# Patient Record
Sex: MALE | Race: WHITE
[De-identification: names, ages, dates, MRNs, and addresses within clinical notes are randomized per-mention and may not be internally consistent; named-entity substitution may affect disease eponyms.]

---

## 2019-11-03 ENCOUNTER — HOSPITAL ENCOUNTER (EMERGENCY)
Dept: HOSPITAL 62 - ER | Age: 39
Discharge: OTHER INSTITUTION | End: 2019-11-03
Payer: SELF-PAY

## 2019-11-03 VITALS — SYSTOLIC BLOOD PRESSURE: 115 MMHG | DIASTOLIC BLOOD PRESSURE: 79 MMHG

## 2019-11-03 DIAGNOSIS — R41.82: Primary | ICD-10-CM

## 2019-11-03 DIAGNOSIS — F17.200: ICD-10-CM

## 2019-11-03 LAB
ADD MANUAL DIFF: NO
ADD MANUAL MICROSCOPIC: YES
ALBUMIN SERPL-MCNC: 4.2 G/DL (ref 3.5–5)
ALP SERPL-CCNC: 92 U/L (ref 38–126)
ANION GAP SERPL CALC-SCNC: 7 MMOL/L (ref 5–19)
APAP SERPL-MCNC: < 10 UG/ML (ref 10–30)
APPEARANCE UR: CLEAR
APTT PPP: YELLOW S
AST SERPL-CCNC: 30 U/L (ref 17–59)
BACTERIA #/AREA URNS HPF: (no result) /HPF
BARBITURATES UR QL SCN: NEGATIVE
BASOPHILS # BLD AUTO: 0 10^3/UL (ref 0–0.2)
BASOPHILS NFR BLD AUTO: 0.4 % (ref 0–2)
BILIRUB DIRECT SERPL-MCNC: 0 MG/DL (ref 0–0.4)
BILIRUB SERPL-MCNC: 1.1 MG/DL (ref 0.2–1.3)
BILIRUB UR QL STRIP: NEGATIVE
BUN SERPL-MCNC: 15 MG/DL (ref 7–20)
CALCIUM: 9.5 MG/DL (ref 8.4–10.2)
CHLORIDE SERPL-SCNC: 101 MMOL/L (ref 98–107)
CO2 SERPL-SCNC: 33 MMOL/L (ref 22–30)
EOSINOPHIL # BLD AUTO: 0.2 10^3/UL (ref 0–0.6)
EOSINOPHIL NFR BLD AUTO: 3.6 % (ref 0–6)
ERYTHROCYTE [DISTWIDTH] IN BLOOD BY AUTOMATED COUNT: 12.8 % (ref 11.5–14)
ETHANOL SERPL-MCNC: < 10 MG/DL
GLUCOSE SERPL-MCNC: 89 MG/DL (ref 75–110)
GLUCOSE UR STRIP-MCNC: NEGATIVE MG/DL
HCT VFR BLD CALC: 41.2 % (ref 37.9–51)
HGB BLD-MCNC: 14.2 G/DL (ref 13.5–17)
HYALINE CASTS #/AREA URNS LPF: (no result) /LPF
KETONES UR STRIP-MCNC: NEGATIVE MG/DL
LYMPHOCYTES # BLD AUTO: 2.2 10^3/UL (ref 0.5–4.7)
LYMPHOCYTES NFR BLD AUTO: 39.2 % (ref 13–45)
MCH RBC QN AUTO: 30.3 PG (ref 27–33.4)
MCHC RBC AUTO-ENTMCNC: 34.4 G/DL (ref 32–36)
MCV RBC AUTO: 88 FL (ref 80–97)
METHADONE UR QL SCN: NEGATIVE
MONOCYTES # BLD AUTO: 0.4 10^3/UL (ref 0.1–1.4)
MONOCYTES NFR BLD AUTO: 8 % (ref 3–13)
NEUTROPHILS # BLD AUTO: 2.7 10^3/UL (ref 1.7–8.2)
NEUTS SEG NFR BLD AUTO: 48.8 % (ref 42–78)
NITRITE UR QL STRIP: NEGATIVE
PCP UR QL SCN: NEGATIVE
PH UR STRIP: 5 [PH] (ref 5–9)
PLATELET # BLD: 208 10^3/UL (ref 150–450)
POTASSIUM SERPL-SCNC: 4.1 MMOL/L (ref 3.6–5)
PROT SERPL-MCNC: 7.5 G/DL (ref 6.3–8.2)
PROT UR STRIP-MCNC: NEGATIVE MG/DL
RBC # BLD AUTO: 4.69 10^6/UL (ref 4.35–5.55)
RBC #/AREA URNS HPF: (no result) /HPF
SALICYLATES SERPL-MCNC: < 1 MG/DL (ref 2–20)
SP GR UR STRIP: 1.03
TOTAL CELLS COUNTED % (AUTO): 100 %
URINE BENZODIAZEPINES SCREEN: NEGATIVE
URINE COCAINE SCREEN: NEGATIVE
URINE MARIJUANA (THC) SCREEN: NEGATIVE
UROBILINOGEN UR-MCNC: 2 MG/DL (ref ?–2)
WBC # BLD AUTO: 5.5 10^3/UL (ref 4–10.5)
WBC #/AREA URNS HPF: (no result) /HPF

## 2019-11-03 PROCEDURE — 80053 COMPREHEN METABOLIC PANEL: CPT

## 2019-11-03 PROCEDURE — 93010 ELECTROCARDIOGRAM REPORT: CPT

## 2019-11-03 PROCEDURE — 80307 DRUG TEST PRSMV CHEM ANLYZR: CPT

## 2019-11-03 PROCEDURE — 85025 COMPLETE CBC W/AUTO DIFF WBC: CPT

## 2019-11-03 PROCEDURE — 81001 URINALYSIS AUTO W/SCOPE: CPT

## 2019-11-03 PROCEDURE — 36415 COLL VENOUS BLD VENIPUNCTURE: CPT

## 2019-11-03 PROCEDURE — 99284 EMERGENCY DEPT VISIT MOD MDM: CPT

## 2019-11-03 PROCEDURE — 93005 ELECTROCARDIOGRAM TRACING: CPT

## 2019-11-03 NOTE — ER DOCUMENT REPORT
ED Substance Abuse / Acc. OD





<GM BARBOZA - Last Filed: 11/03/19 09:54>





- General


Mode of Arrival: Ambulatory


Information source: Patient


Cannot obtain history due to: Other - Patient drowsy, patient states he has 

difficulty concentrating on the answers to questions





- HPI


Patient complains to provider of: Substance abuse


Onset: This afternoon


Quality of pain: No pain


Associated Symptoms: None





<JAZMIN CHAIREZ - Last Filed: 11/04/19 01:15>





- General


Chief Complaint: Medical Clearance


Stated Complaint: DRUG WITHDRAWAL


Time Seen by Provider: 11/03/19 01:32 EST


Notes: 





Patient was sent here from the detox facility due to him falling asleep in a 

quiet room.  Patient states that he was seeking treatment for substance abuse 

involving snorting heroin and methamphetamine.  Patient denies any IV drug use. 

Patient denies any suicidal homicidal ideation.  Patient reports last using 

heroin and meth earlier this afternoon. (JAZMIN CHAIREZ)





- Related Data


Allergies/Adverse Reactions: 


                                        





No Known Allergies Allergy (Unverified 11/03/19 01:25 EST)


   











Past Medical History





- General


Information source: Patient


Cannot obtain history due to: Intoxicated





- Social History


Smoking Status: Current Every Day Smoker


Drug Abuse: Heroin, Methamphetamine


Family History: Reviewed & Not Pertinent


Patient has suicidal ideation: No


Patient has homicidal ideation: No


Psychiatric Medical History: Reports: Other - Unspecified mental illness





<JAZMIN CHAIREZ - Last Filed: 11/04/19 01:15>





Review of Systems





- Review of Systems


-: Yes ROS unobtainable due to patient's medical condition





<JAZMIN CHAIREZ - Last Filed: 11/04/19 01:15>





Physical Exam





- General


General appearance: Appears well, Other - drowsy, arouses easily to voice


In distress: None





- HEENT


Head: Normocephalic, Atraumatic


Eyes: Normal


Nasal: Normal


Neck: Normal, Supple





- Respiratory


Respiratory status: No respiratory distress


Chest status: Nontender


Breath sounds: Normal.  No: Rales, Rhonchi, Stridor, Wheezing


Chest palpation: Normal





- Cardiovascular


Rhythm: Regular


Heart sounds: S1 appreciated, S2 appreciated





- Back


Back: Normal





- Extremities


General upper extremity: Normal inspection, Normal strength


General lower extremity: Normal inspection, Normal strength





- Neurological


Boston Coma Scale Eye Opening: Spontaneous


Tadeo Coma Scale Verbal: Oriented


Tadeo Coma Scale Motor: Obeys Commands


Boston Coma Scale Total: 15





- Skin


Skin Temperature: Warm


Skin Moisture: Dry


Skin Color: Normal





<JAZMIN CHAIREZ - Last Filed: 11/04/19 01:15>





- Vital signs


Vitals: 


                                        











Temp Pulse Resp BP Pulse Ox


 


 98.0 F   77   18   122/95 H  98 


 


 11/03/19 01:23 EST  11/03/19 01:23 EST  11/03/19 01:23 EST  11/03/19 01:23 EST 

11/03/19 01:23 EST














Course





- Laboratory


Result Diagrams: 


                                 11/03/19 02:15





                                 11/03/19 02:15





<GM BARBOZA - Last Filed: 11/03/19 09:54>





- Laboratory


Result Diagrams: 


                                 11/03/19 02:15





                                 11/03/19 02:15





<JAZMIN CHAIREZ - Last Filed: 11/04/19 01:15>





- Re-evaluation


Re-evalutation: 





11/03/19 09:54





Laboratory











  11/03/19 11/03/19 11/03/19





  02:15 02:15 06:50


 


WBC  5.5  


 


RBC  4.69  


 


Hgb  14.2  


 


Hct  41.2  


 


MCV  88  


 


MCH  30.3  


 


MCHC  34.4  


 


RDW  12.8  


 


Plt Count  208  


 


Lymph % (Auto)  39.2  


 


Mono % (Auto)  8.0  


 


Eos % (Auto)  3.6  


 


Baso % (Auto)  0.4  


 


Absolute Neuts (auto)  2.7  


 


Absolute Lymphs (auto)  2.2  


 


Absolute Monos (auto)  0.4  


 


Absolute Eos (auto)  0.2  


 


Absolute Basos (auto)  0.0  


 


Seg Neutrophils %  48.8  


 


Sodium   140.9 


 


Potassium   4.1 


 


Chloride   101 


 


Carbon Dioxide   33 H 


 


Anion Gap   7 


 


BUN   15 


 


Creatinine   1.04 


 


Est GFR ( Amer)   > 60 


 


Est GFR (MDRD) Non-Af   > 60 


 


Glucose   89 


 


Calcium   9.5 


 


Total Bilirubin   1.1 


 


Direct Bilirubin   0.0 


 


Neonat Total Bilirubin   Not Reportable 


 


Neonat Direct Bilirubin   Not Reportable 


 


Neonat Indirect Bili   Not Reportable 


 


AST   30 


 


ALT   32 


 


Alkaline Phosphatase   92 


 


Total Protein   7.5 


 


Albumin   4.2 


 


Urine Color    YELLOW


 


Urine Appearance    CLEAR


 


Urine pH    5.0


 


Ur Specific Gravity    1.026


 


Urine Protein    NEGATIVE


 


Urine Glucose (UA)    NEGATIVE


 


Urine Ketones    NEGATIVE


 


Urine Blood    NEGATIVE


 


Urine Nitrite    NEGATIVE


 


Urine Bilirubin    NEGATIVE


 


Urine Urobilinogen    2.0 H


 


Ur Leukocyte Esterase    NEGATIVE


 


Urine RBC    1-5


 


Urine WBC    1-5


 


Urine Bacteria    TRACE


 


Hyaline Casts    RARE


 


Urine Ascorbic Acid    NEGATIVE


 


Salicylates   < 1.0 L 


 


Urine Opiates Screen   


 


Urine Methadone Screen   


 


Acetaminophen   < 10 L 


 


Ur Barbiturates Screen   


 


Ur Phencyclidine Scrn   


 


Ur Amphetamines Screen   


 


U Benzodiazepines Scrn   


 


Urine Cocaine Screen   


 


U Marijuana (THC) Screen   


 


Serum Alcohol   < 10 














  11/03/19





  06:50


 


WBC 


 


RBC 


 


Hgb 


 


Hct 


 


MCV 


 


MCH 


 


MCHC 


 


RDW 


 


Plt Count 


 


Lymph % (Auto) 


 


Mono % (Auto) 


 


Eos % (Auto) 


 


Baso % (Auto) 


 


Absolute Neuts (auto) 


 


Absolute Lymphs (auto) 


 


Absolute Monos (auto) 


 


Absolute Eos (auto) 


 


Absolute Basos (auto) 


 


Seg Neutrophils % 


 


Sodium 


 


Potassium 


 


Chloride 


 


Carbon Dioxide 


 


Anion Gap 


 


BUN 


 


Creatinine 


 


Est GFR ( Amer) 


 


Est GFR (MDRD) Non-Af 


 


Glucose 


 


Calcium 


 


Total Bilirubin 


 


Direct Bilirubin 


 


Neonat Total Bilirubin 


 


Neonat Direct Bilirubin 


 


Neonat Indirect Bili 


 


AST 


 


ALT 


 


Alkaline Phosphatase 


 


Total Protein 


 


Albumin 


 


Urine Color 


 


Urine Appearance 


 


Urine pH 


 


Ur Specific Gravity 


 


Urine Protein 


 


Urine Glucose (UA) 


 


Urine Ketones 


 


Urine Blood 


 


Urine Nitrite 


 


Urine Bilirubin 


 


Urine Urobilinogen 


 


Ur Leukocyte Esterase 


 


Urine RBC 


 


Urine WBC 


 


Urine Bacteria 


 


Hyaline Casts 


 


Urine Ascorbic Acid 


 


Salicylates 


 


Urine Opiates Screen  UNCONFIRMED POSITIVE


 


Urine Methadone Screen  NEGATIVE


 


Acetaminophen 


 


Ur Barbiturates Screen  NEGATIVE


 


Ur Phencyclidine Scrn  NEGATIVE


 


Ur Amphetamines Screen  


 


U Benzodiazepines Scrn  NEGATIVE


 


Urine Cocaine Screen  NEGATIVE


 


U Marijuana (THC) Screen  NEGATIVE


 


Serum Alcohol 








I have evaluated this patient at bedside.  He is initially sleeping, easily 

arousable to verbal stimuli.  He is conscious alert and oriented x4.  He voices 

that he is attempting to go to detox.  [facility] detox facility made him come to 

the emergency room.  Patient voices no complaints.


Stable for discharge. (GM BARBOZA)





11/03/19 07:26


Patient sleeping, arouses easily to voice.


11/03/19 08:10


Report and handoff given to chest related Kim SMART.  Awaiting mental health

evaluation. (JAZMIN CHAIREZ)





- Vital Signs


Vital signs: 


                                        











Temp Pulse Resp BP Pulse Ox


 


 98.0 F   77   15   115/79   99 


 


 11/03/19 01:23 EST  11/03/19 01:23 EST  11/03/19 05:01  11/03/19 09:01  

11/03/19 09:01














- Laboratory


Laboratory results interpreted by me: 


                                        











  11/03/19 11/03/19





  02:15 06:50


 


Carbon Dioxide  33 H 


 


Urine Urobilinogen   2.0 H


 


Salicylates  < 1.0 L 


 


Acetaminophen  < 10 L 














Discharge





<GM BARBOZA - Last Filed: 11/03/19 09:54>





<JAZMIN CHAIREZ - Last Filed: 11/04/19 01:15>





- Discharge


Clinical Impression: 


Altered mental state


Qualifiers:


 Altered mental status type: unspecified Qualified Code(s): R41.82 - Altered 

mental status, unspecified





Condition: Stable


Disposition: OTHER


Additional Instructions: 


As we discussed you have been seen and treated in the emergency department for 

medical clearance to go to detox.  Please make sure you are going to detox.  

Please return to the emergency department for any concerns.

## 2019-11-03 NOTE — EKG REPORT
SEVERITY:- ABNORMAL ECG -

SINUS RHYTHM

CONSIDER ANTEROSEPTAL INFARCT

:

Confirmed by: John Tena MD 03-Nov-2019 08:09:00